# Patient Record
Sex: FEMALE | Race: WHITE | Employment: OTHER | URBAN - METROPOLITAN AREA
[De-identification: names, ages, dates, MRNs, and addresses within clinical notes are randomized per-mention and may not be internally consistent; named-entity substitution may affect disease eponyms.]

---

## 2022-06-04 ENCOUNTER — NURSE ONLY (OUTPATIENT)
Dept: LAB | Age: 77
End: 2022-06-04
Attending: FAMILY MEDICINE

## 2022-06-04 DIAGNOSIS — M62.81 MUSCLE WEAKNESS (GENERALIZED): Primary | ICD-10-CM

## 2022-06-04 LAB
ALBUMIN SERPL-MCNC: 3.4 G/DL (ref 3.4–5)
ALBUMIN/GLOB SERPL: 0.9 {RATIO} (ref 1–2)
ALP LIVER SERPL-CCNC: 70 U/L
ALT SERPL-CCNC: 23 U/L
ANION GAP SERPL CALC-SCNC: 5 MMOL/L (ref 0–18)
AST SERPL-CCNC: 10 U/L (ref 15–37)
BASOPHILS # BLD AUTO: 0.03 X10(3) UL (ref 0–0.2)
BASOPHILS NFR BLD AUTO: 0.3 %
BILIRUB SERPL-MCNC: 0.6 MG/DL (ref 0.1–2)
BUN BLD-MCNC: 15 MG/DL (ref 7–18)
CALCIUM BLD-MCNC: 10 MG/DL (ref 8.5–10.1)
CHLORIDE SERPL-SCNC: 107 MMOL/L (ref 98–112)
CO2 SERPL-SCNC: 25 MMOL/L (ref 21–32)
CREAT BLD-MCNC: 0.75 MG/DL
EOSINOPHIL # BLD AUTO: 0.15 X10(3) UL (ref 0–0.7)
EOSINOPHIL NFR BLD AUTO: 1.7 %
ERYTHROCYTE [DISTWIDTH] IN BLOOD BY AUTOMATED COUNT: 11.9 %
FASTING STATUS PATIENT QL REPORTED: YES
GLOBULIN PLAS-MCNC: 3.6 G/DL (ref 2.8–4.4)
GLUCOSE BLD-MCNC: 97 MG/DL (ref 70–99)
HCT VFR BLD AUTO: 44.4 %
HGB BLD-MCNC: 14.3 G/DL
IMM GRANULOCYTES # BLD AUTO: 0.05 X10(3) UL (ref 0–1)
IMM GRANULOCYTES NFR BLD: 0.6 %
LYMPHOCYTES # BLD AUTO: 1.35 X10(3) UL (ref 1–4)
LYMPHOCYTES NFR BLD AUTO: 15.1 %
MCH RBC QN AUTO: 30.2 PG (ref 26–34)
MCHC RBC AUTO-ENTMCNC: 32.2 G/DL (ref 31–37)
MCV RBC AUTO: 93.7 FL
MONOCYTES # BLD AUTO: 0.81 X10(3) UL (ref 0.1–1)
MONOCYTES NFR BLD AUTO: 9.1 %
NEUTROPHILS # BLD AUTO: 6.56 X10 (3) UL (ref 1.5–7.7)
NEUTROPHILS # BLD AUTO: 6.56 X10(3) UL (ref 1.5–7.7)
NEUTROPHILS NFR BLD AUTO: 73.2 %
OSMOLALITY SERPL CALC.SUM OF ELEC: 285 MOSM/KG (ref 275–295)
PLATELET # BLD AUTO: 272 10(3)UL (ref 150–450)
POTASSIUM SERPL-SCNC: 4.1 MMOL/L (ref 3.5–5.1)
PROT SERPL-MCNC: 7 G/DL (ref 6.4–8.2)
RBC # BLD AUTO: 4.74 X10(6)UL
SODIUM SERPL-SCNC: 137 MMOL/L (ref 136–145)
WBC # BLD AUTO: 9 X10(3) UL (ref 4–11)

## 2022-06-04 PROCEDURE — 80053 COMPREHEN METABOLIC PANEL: CPT

## 2022-06-04 PROCEDURE — 85025 COMPLETE CBC W/AUTO DIFF WBC: CPT

## 2022-06-05 ENCOUNTER — NURSE ONLY (OUTPATIENT)
Dept: LAB | Age: 77
End: 2022-06-05
Attending: FAMILY MEDICINE

## 2022-06-05 DIAGNOSIS — M62.81 MUSCLE WEAKNESS: Primary | ICD-10-CM

## 2022-06-05 LAB
ALBUMIN SERPL-MCNC: 3.1 G/DL (ref 3.4–5)
ALBUMIN/GLOB SERPL: 1 {RATIO} (ref 1–2)
ALP LIVER SERPL-CCNC: 71 U/L
ALT SERPL-CCNC: 18 U/L
ANION GAP SERPL CALC-SCNC: 5 MMOL/L (ref 0–18)
AST SERPL-CCNC: 13 U/L (ref 15–37)
BASOPHILS # BLD AUTO: 0.04 X10(3) UL (ref 0–0.2)
BASOPHILS NFR BLD AUTO: 0.5 %
BILIRUB SERPL-MCNC: 0.6 MG/DL (ref 0.1–2)
BUN BLD-MCNC: 14 MG/DL (ref 7–18)
CALCIUM BLD-MCNC: 9.8 MG/DL (ref 8.5–10.1)
CHLORIDE SERPL-SCNC: 106 MMOL/L (ref 98–112)
CO2 SERPL-SCNC: 26 MMOL/L (ref 21–32)
CREAT BLD-MCNC: 0.74 MG/DL
EOSINOPHIL # BLD AUTO: 0.11 X10(3) UL (ref 0–0.7)
EOSINOPHIL NFR BLD AUTO: 1.3 %
ERYTHROCYTE [DISTWIDTH] IN BLOOD BY AUTOMATED COUNT: 12.1 %
GLOBULIN PLAS-MCNC: 3.2 G/DL (ref 2.8–4.4)
GLUCOSE BLD-MCNC: 94 MG/DL (ref 70–99)
HCT VFR BLD AUTO: 40.1 %
HGB BLD-MCNC: 13.1 G/DL
IMM GRANULOCYTES # BLD AUTO: 0.06 X10(3) UL (ref 0–1)
IMM GRANULOCYTES NFR BLD: 0.7 %
LYMPHOCYTES # BLD AUTO: 1.34 X10(3) UL (ref 1–4)
LYMPHOCYTES NFR BLD AUTO: 15.6 %
MCH RBC QN AUTO: 29.9 PG (ref 26–34)
MCHC RBC AUTO-ENTMCNC: 32.7 G/DL (ref 31–37)
MCV RBC AUTO: 91.6 FL
MONOCYTES # BLD AUTO: 0.86 X10(3) UL (ref 0.1–1)
MONOCYTES NFR BLD AUTO: 10 %
NEUTROPHILS # BLD AUTO: 6.17 X10 (3) UL (ref 1.5–7.7)
NEUTROPHILS # BLD AUTO: 6.17 X10(3) UL (ref 1.5–7.7)
NEUTROPHILS NFR BLD AUTO: 71.9 %
OSMOLALITY SERPL CALC.SUM OF ELEC: 284 MOSM/KG (ref 275–295)
PLATELET # BLD AUTO: 260 10(3)UL (ref 150–450)
POTASSIUM SERPL-SCNC: 3.9 MMOL/L (ref 3.5–5.1)
PROT SERPL-MCNC: 6.3 G/DL (ref 6.4–8.2)
RBC # BLD AUTO: 4.38 X10(6)UL
SODIUM SERPL-SCNC: 137 MMOL/L (ref 136–145)
WBC # BLD AUTO: 8.6 X10(3) UL (ref 4–11)

## 2022-06-05 PROCEDURE — 80053 COMPREHEN METABOLIC PANEL: CPT

## 2022-06-05 PROCEDURE — 85025 COMPLETE CBC W/AUTO DIFF WBC: CPT

## 2025-01-09 ENCOUNTER — HOSPITAL ENCOUNTER (INPATIENT)
Facility: HOSPITAL | Age: 80
LOS: 3 days | Discharge: HOME | End: 2025-01-13
Attending: EMERGENCY MEDICINE | Admitting: STUDENT IN AN ORGANIZED HEALTH CARE EDUCATION/TRAINING PROGRAM
Payer: MEDICARE

## 2025-01-09 ENCOUNTER — APPOINTMENT (OUTPATIENT)
Dept: CT IMAGING | Facility: HOSPITAL | Age: 80
End: 2025-01-09
Attending: EMERGENCY MEDICINE
Payer: MEDICARE

## 2025-01-09 DIAGNOSIS — J18.9 COMMUNITY ACQUIRED PNEUMONIA OF RIGHT LOWER LOBE OF LUNG: ICD-10-CM

## 2025-01-09 DIAGNOSIS — G35 MULTIPLE SCLEROSIS EXACERBATION (HCC): ICD-10-CM

## 2025-01-09 DIAGNOSIS — R53.1 WEAKNESS GENERALIZED: Primary | ICD-10-CM

## 2025-01-09 LAB
ALBUMIN SERPL-MCNC: 4.6 G/DL (ref 3.2–4.8)
ALBUMIN/GLOB SERPL: 1.6 {RATIO} (ref 1–2)
ALP LIVER SERPL-CCNC: 97 U/L
ALT SERPL-CCNC: 18 U/L
ANION GAP SERPL CALC-SCNC: 5 MMOL/L (ref 0–18)
AST SERPL-CCNC: 16 U/L (ref ?–34)
BASOPHILS # BLD AUTO: 0.03 X10(3) UL (ref 0–0.2)
BASOPHILS NFR BLD AUTO: 0.4 %
BILIRUB SERPL-MCNC: 0.6 MG/DL (ref 0.2–1.1)
BUN BLD-MCNC: 15 MG/DL (ref 9–23)
CALCIUM BLD-MCNC: 10.6 MG/DL (ref 8.7–10.4)
CHLORIDE SERPL-SCNC: 103 MMOL/L (ref 98–112)
CO2 SERPL-SCNC: 27 MMOL/L (ref 21–32)
CREAT BLD-MCNC: 1.03 MG/DL
EGFRCR SERPLBLD CKD-EPI 2021: 55 ML/MIN/1.73M2 (ref 60–?)
EOSINOPHIL # BLD AUTO: 0.07 X10(3) UL (ref 0–0.7)
EOSINOPHIL NFR BLD AUTO: 1 %
ERYTHROCYTE [DISTWIDTH] IN BLOOD BY AUTOMATED COUNT: 12.3 %
GLOBULIN PLAS-MCNC: 2.8 G/DL (ref 2–3.5)
GLUCOSE BLD-MCNC: 106 MG/DL (ref 70–99)
GLUCOSE BLD-MCNC: 107 MG/DL (ref 70–99)
HCT VFR BLD AUTO: 43.8 %
HGB BLD-MCNC: 15.2 G/DL
IMM GRANULOCYTES # BLD AUTO: 0.02 X10(3) UL (ref 0–1)
IMM GRANULOCYTES NFR BLD: 0.3 %
LYMPHOCYTES # BLD AUTO: 0.76 X10(3) UL (ref 1–4)
LYMPHOCYTES NFR BLD AUTO: 10.9 %
MCH RBC QN AUTO: 30.3 PG (ref 26–34)
MCHC RBC AUTO-ENTMCNC: 34.7 G/DL (ref 31–37)
MCV RBC AUTO: 87.4 FL
MONOCYTES # BLD AUTO: 0.78 X10(3) UL (ref 0.1–1)
MONOCYTES NFR BLD AUTO: 11.2 %
NEUTROPHILS # BLD AUTO: 5.32 X10 (3) UL (ref 1.5–7.7)
NEUTROPHILS # BLD AUTO: 5.32 X10(3) UL (ref 1.5–7.7)
NEUTROPHILS NFR BLD AUTO: 76.2 %
OSMOLALITY SERPL CALC.SUM OF ELEC: 281 MOSM/KG (ref 275–295)
PLATELET # BLD AUTO: 190 10(3)UL (ref 150–450)
POTASSIUM SERPL-SCNC: 4.2 MMOL/L (ref 3.5–5.1)
PROT SERPL-MCNC: 7.4 G/DL (ref 5.7–8.2)
RBC # BLD AUTO: 5.01 X10(6)UL
SODIUM SERPL-SCNC: 135 MMOL/L (ref 136–145)
TROPONIN I SERPL HS-MCNC: 3 NG/L
WBC # BLD AUTO: 7 X10(3) UL (ref 4–11)

## 2025-01-09 PROCEDURE — 70498 CT ANGIOGRAPHY NECK: CPT | Performed by: EMERGENCY MEDICINE

## 2025-01-09 PROCEDURE — 70496 CT ANGIOGRAPHY HEAD: CPT | Performed by: EMERGENCY MEDICINE

## 2025-01-09 PROCEDURE — 70450 CT HEAD/BRAIN W/O DYE: CPT | Performed by: EMERGENCY MEDICINE

## 2025-01-10 ENCOUNTER — APPOINTMENT (OUTPATIENT)
Dept: GENERAL RADIOLOGY | Facility: HOSPITAL | Age: 80
End: 2025-01-10
Attending: EMERGENCY MEDICINE
Payer: MEDICARE

## 2025-01-10 PROBLEM — J18.9 COMMUNITY ACQUIRED PNEUMONIA OF RIGHT LOWER LOBE OF LUNG: Status: ACTIVE | Noted: 2025-01-10

## 2025-01-10 PROBLEM — G35 MULTIPLE SCLEROSIS EXACERBATION (HCC): Status: ACTIVE | Noted: 2025-01-10

## 2025-01-10 LAB
ANION GAP SERPL CALC-SCNC: 10 MMOL/L (ref 0–18)
BILIRUB UR QL STRIP.AUTO: NEGATIVE
BUN BLD-MCNC: 13 MG/DL (ref 9–23)
CALCIUM BLD-MCNC: 9.8 MG/DL (ref 8.7–10.4)
CHLORIDE SERPL-SCNC: 106 MMOL/L (ref 98–112)
CLARITY UR REFRACT.AUTO: CLEAR
CO2 SERPL-SCNC: 22 MMOL/L (ref 21–32)
COLOR UR AUTO: COLORLESS
CREAT BLD-MCNC: 0.84 MG/DL
EGFRCR SERPLBLD CKD-EPI 2021: 71 ML/MIN/1.73M2 (ref 60–?)
FLUAV + FLUBV RNA SPEC NAA+PROBE: NEGATIVE
FLUAV + FLUBV RNA SPEC NAA+PROBE: NEGATIVE
GLUCOSE BLD-MCNC: 142 MG/DL (ref 70–99)
GLUCOSE UR STRIP.AUTO-MCNC: NORMAL MG/DL
KETONES UR STRIP.AUTO-MCNC: NEGATIVE MG/DL
LACTATE SERPL-SCNC: 0.9 MMOL/L (ref 0.5–2)
LEUKOCYTE ESTERASE UR QL STRIP.AUTO: 250
NT-PROBNP SERPL-MCNC: 200 PG/ML (ref ?–450)
OSMOLALITY SERPL CALC.SUM OF ELEC: 289 MOSM/KG (ref 275–295)
PH UR STRIP.AUTO: 5 [PH] (ref 5–8)
POTASSIUM SERPL-SCNC: 4.1 MMOL/L (ref 3.5–5.1)
PROCALCITONIN SERPL-MCNC: 0.08 NG/ML (ref ?–0.05)
PROT UR STRIP.AUTO-MCNC: NEGATIVE MG/DL
RBC UR QL AUTO: NEGATIVE
RSV RNA SPEC NAA+PROBE: NEGATIVE
SARS-COV-2 RNA RESP QL NAA+PROBE: NOT DETECTED
SODIUM SERPL-SCNC: 138 MMOL/L (ref 136–145)
SP GR UR STRIP.AUTO: >1.03 (ref 1–1.03)
UROBILINOGEN UR STRIP.AUTO-MCNC: NORMAL MG/DL

## 2025-01-10 PROCEDURE — 71045 X-RAY EXAM CHEST 1 VIEW: CPT | Performed by: EMERGENCY MEDICINE

## 2025-01-10 PROCEDURE — 99223 1ST HOSP IP/OBS HIGH 75: CPT | Performed by: STUDENT IN AN ORGANIZED HEALTH CARE EDUCATION/TRAINING PROGRAM

## 2025-01-10 PROCEDURE — 99223 1ST HOSP IP/OBS HIGH 75: CPT | Performed by: OTHER

## 2025-01-10 RX ORDER — SODIUM PHOSPHATE, DIBASIC AND SODIUM PHOSPHATE, MONOBASIC 7; 19 G/230ML; G/230ML
1 ENEMA RECTAL ONCE AS NEEDED
Status: DISCONTINUED | OUTPATIENT
Start: 2025-01-10 | End: 2025-01-13

## 2025-01-10 RX ORDER — ECHINACEA PURPUREA EXTRACT 125 MG
1 TABLET ORAL
Status: DISCONTINUED | OUTPATIENT
Start: 2025-01-10 | End: 2025-01-13

## 2025-01-10 RX ORDER — BENZONATATE 100 MG/1
200 CAPSULE ORAL 3 TIMES DAILY PRN
Status: DISCONTINUED | OUTPATIENT
Start: 2025-01-10 | End: 2025-01-13

## 2025-01-10 RX ORDER — ACETAMINOPHEN 650 MG/1
650 SUPPOSITORY RECTAL ONCE
Status: COMPLETED | OUTPATIENT
Start: 2025-01-10 | End: 2025-01-10

## 2025-01-10 RX ORDER — BISACODYL 10 MG
10 SUPPOSITORY, RECTAL RECTAL
Status: DISCONTINUED | OUTPATIENT
Start: 2025-01-10 | End: 2025-01-13

## 2025-01-10 RX ORDER — TIZANIDINE 2 MG/1
2 TABLET ORAL EVERY 6 HOURS PRN
Status: DISCONTINUED | OUTPATIENT
Start: 2025-01-10 | End: 2025-01-13

## 2025-01-10 RX ORDER — CLONAZEPAM 0.5 MG/1
0.5 TABLET ORAL NIGHTLY
COMMUNITY

## 2025-01-10 RX ORDER — METOCLOPRAMIDE HYDROCHLORIDE 5 MG/ML
5 INJECTION INTRAMUSCULAR; INTRAVENOUS EVERY 8 HOURS PRN
Status: DISCONTINUED | OUTPATIENT
Start: 2025-01-10 | End: 2025-01-13

## 2025-01-10 RX ORDER — ACETAMINOPHEN 500 MG
500 TABLET ORAL EVERY 4 HOURS PRN
Status: DISCONTINUED | OUTPATIENT
Start: 2025-01-10 | End: 2025-01-13

## 2025-01-10 RX ORDER — LACTOBACILLUS ACIDOPHILUS 500MM CELL
1 CAPSULE ORAL 2 TIMES DAILY
Status: DISCONTINUED | OUTPATIENT
Start: 2025-01-10 | End: 2025-01-13

## 2025-01-10 RX ORDER — ATORVASTATIN CALCIUM 20 MG/1
20 TABLET, FILM COATED ORAL NIGHTLY
Status: DISCONTINUED | OUTPATIENT
Start: 2025-01-10 | End: 2025-01-13

## 2025-01-10 RX ORDER — SENNOSIDES 8.6 MG
17.2 TABLET ORAL NIGHTLY PRN
Status: DISCONTINUED | OUTPATIENT
Start: 2025-01-10 | End: 2025-01-13

## 2025-01-10 RX ORDER — FOLIC ACID 0.4 MG
400 TABLET ORAL DAILY
COMMUNITY

## 2025-01-10 RX ORDER — ONDANSETRON 2 MG/ML
4 INJECTION INTRAMUSCULAR; INTRAVENOUS EVERY 6 HOURS PRN
Status: DISCONTINUED | OUTPATIENT
Start: 2025-01-10 | End: 2025-01-13

## 2025-01-10 RX ORDER — METOCLOPRAMIDE HYDROCHLORIDE 5 MG/ML
10 INJECTION INTRAMUSCULAR; INTRAVENOUS EVERY 8 HOURS PRN
Status: DISCONTINUED | OUTPATIENT
Start: 2025-01-10 | End: 2025-01-10

## 2025-01-10 RX ORDER — PANTOPRAZOLE SODIUM 20 MG/1
20 TABLET, DELAYED RELEASE ORAL
Status: DISCONTINUED | OUTPATIENT
Start: 2025-01-10 | End: 2025-01-13

## 2025-01-10 RX ORDER — FOLIC ACID 0.4 MG
400 TABLET ORAL DAILY
Status: DISCONTINUED | OUTPATIENT
Start: 2025-01-10 | End: 2025-01-13

## 2025-01-10 RX ORDER — ALBUTEROL SULFATE 0.83 MG/ML
2.5 SOLUTION RESPIRATORY (INHALATION) EVERY 4 HOURS PRN
Status: DISCONTINUED | OUTPATIENT
Start: 2025-01-10 | End: 2025-01-13

## 2025-01-10 RX ORDER — ASPIRIN 325 MG
325 TABLET ORAL DAILY
COMMUNITY

## 2025-01-10 RX ORDER — POLYETHYLENE GLYCOL 3350 17 G/17G
17 POWDER, FOR SOLUTION ORAL DAILY PRN
Status: DISCONTINUED | OUTPATIENT
Start: 2025-01-10 | End: 2025-01-13

## 2025-01-10 RX ORDER — ENOXAPARIN SODIUM 100 MG/ML
40 INJECTION SUBCUTANEOUS DAILY
Status: DISCONTINUED | OUTPATIENT
Start: 2025-01-10 | End: 2025-01-13

## 2025-01-10 RX ORDER — SACCHAROMYCES BOULARDII 250 MG
250 CAPSULE ORAL 2 TIMES DAILY
COMMUNITY

## 2025-01-10 RX ORDER — ASPIRIN 325 MG
325 TABLET ORAL DAILY
Status: DISCONTINUED | OUTPATIENT
Start: 2025-01-10 | End: 2025-01-13

## 2025-01-10 RX ORDER — MIRABEGRON 50 MG/1
50 TABLET, FILM COATED, EXTENDED RELEASE ORAL DAILY
Status: DISCONTINUED | OUTPATIENT
Start: 2025-01-10 | End: 2025-01-13

## 2025-01-10 NOTE — H&P
[facility]  [facility]     Starla Ortiz Patient Status:  Emergency    9/15/1945 MRN [id number]   [facility] EMERGENCY DEPARTMENT Attending Juan Jose Tuttle MD   Hosp Day # 0 PCP GIFTY ABDI     Chief Complaint: weakness    Subjective:    History of Present Illness:     Starla Ortiz is a 79 year old female with PMHx MS who presented to the hospital for weakness. She had a non-productive cough for the past 3 days. She saw her PCP and had a negative xray and it was presumed to be viral in etiology. Over the past 24 hours, she decompensated and was having problems swallowing with coughing after eating. She was more fatigued as well. Her daughter tried to get her up from the chair and she was too weak to stand prompting her to call 911.    History/Other:    Past Medical History:  Past Medical History:    CANCER    BASAL CA ON LEFT CHEEK    HEADACHES    MS CAUSED    HOSPITALIZATIONS    4 WITH VIRAL INFECTIONS AND BACTERIAL INFECTION     MENOPAUSE    Menopause    MS (multiple sclerosis) (HCC)    OSTEOPENIA    Osteopenia     Past Surgical History:   Past Surgical History:   Procedure Laterality Date    Colonoscopy      D & c  1970,    Excisional biospy right  AGE 35    Hysterectomy  1984    HARI BSO endometriosis fibroids    Needle biopsy left  2019    benign cyst     Other surgical history      L BREAST biopsy    Other surgical history      REMOVAL OF BASAL CA GACE    Surgery - external      neck surgery    Tonsillectomy        Family History:   Family History   Problem Relation Age of Onset    Cancer Father         LYMPHOMA    Other (Other) Mother         DEMENTIA    Cancer Brother         PROSTATE    Hypertension Daughter      Social History:    reports that she has quit smoking. She has a 2 pack-year smoking history. She has never used smokeless tobacco. She reports current alcohol use. She reports that she does not use drugs.     Allergies:  Allergies[1]    Medications:  Medications Ordered Prior to Encounter[2]    Review of Systems:   A comprehensive review of systems was completed.    Pertinent positives and negatives noted in the HPI.    Objective:   Physical Exam:    /71   Pulse 103   Temp 100.4 °F (38 °C) (Oral)   Resp 20   Wt 166 lb 0.1 oz (75.3 kg)   SpO2 95%   BMI 28.49 kg/m²   General: No acute distress, Alert  Respiratory: scattered rhonchi bl,  no wheezes, on room air  Cardiovascular: S1, S2. Regular rate and rhythm  Abdomen: Soft, Non-tender, non-distended, positive bowel sounds  Neuro: muscle strength 4+/5 with left hip flexion, sensation to light touch intact  Extremities: No edema    Results:    Labs:      Labs Last 24 Hours:    Recent Labs   Lab 01/09/25  2307   RBC 5.01   HGB 15.2   HCT 43.8   MCV 87.4   MCH 30.3   MCHC 34.7   RDW 12.3   NEPRELIM 5.32   WBC 7.0   .0       Recent Labs   Lab 01/09/25  2307   *   BUN 15   CREATSERUM 1.03*   EGFRCR 55*   CA 10.6*   ALB 4.6   *   K 4.2      CO2 27.0   ALKPHO 97   AST 16   ALT 18   BILT 0.6   TP 7.4       Estimated Glomerular Filtration Rate: 55.4 mL/min/1.73m2 (A) (by CKD-EPI based on SCr of 1.03 mg/dL (H)).    No results found for: \"PT\", \"INR\"    Recent Labs   Lab 01/09/25  2307   TROPHS 3       No results for input(s): \"TROP\", \"PBNP\" in the last 168 hours.    No results for input(s): \"PCT\" in the last 168 hours.    Imaging: Imaging data reviewed in Epic.    Assessment & Plan:      #Pneumonia vs UTI with MS exacerbation  #SIRS positive  -COVID/ Flu/ RSV negative  -UA showing 2+ nitrite, 250 leuk est, 21-50 WBC, rare bacteria, few epi cells  -CT brain with chronic small vessel ischemic changes  -CTA head and neck without significant stenosis, left frontal lobe cavernoma  -chest xray showing pulmonary vascular congestion, RML opacity  -met 2 SIRS criteria: , RR 24  -blood cx pending  -urine cx pending  -obtain sputum cx  -check procalcitonin for  possible PNA  -check lactate  -antibiotics: azithromycin, rocephin  -nebs, mucinex, tylenol  -solumedrol for suspected MS exacerbation  -PT/OT  -cont home MS meds  -neurology c/s    #elevated serum creatinine  -Cr 1.03 with BUN 15: baseline 0.74 with GFR 79  -less than 1.5 x elevation  -cont to monitor BMP  -strict I/O, avoid nephrotoxins    #HLD  -cont home statin    #neurogenic bladder  -cont home meds    #GERD  -cont home PPI      Plan of care discussed with ED physician    Lidia Martinez DO    Supplementary Documentation:     The 21st Century Cures Act makes medical notes like these available to patients in the interest of transparency. Please be advised this is a medical document. Medical documents are intended to carry relevant information, facts as evident, and the clinical opinion of the practitioner. The medical note is intended as peer to peer communication and may appear blunt or direct. It is written in medical language and may contain abbreviations or verbiage that are unfamiliar.                                       [1]   Allergies  Allergen Reactions    Codeine [Opioid Analgesics]    [2]   No current facility-administered medications on file prior to encounter.     Current Outpatient Medications on File Prior to Encounter   Medication Sig Dispense Refill    trimethoprim 100 MG Oral Tab Take 100 mg by mouth daily.      atorvastatin 20 MG Oral Tab Take 20 mg by mouth nightly.      aspirin 81 MG Oral Chew Tab       Acidophilus/Pectin Oral Cap Take 1 capsule by mouth daily.      PREMARIN 0.625 MG Oral Tab TAKE 1 TABLET BY MOUTH EVERY DAY 90 tablet 1    MYRBETRIQ 50 MG Oral Tablet 24 Hr Take 1 tablet by mouth daily.      Ergocalciferol (VITAMIN D CAPS 44822 IU OR)       Cranberry 1000 MG Oral Cap Take by mouth.      Esomeprazole Magnesium 20 MG Oral Capsule Delayed Release Take 20 mg by mouth every morning before breakfast.      TiZANidine HCl 2 MG Oral Tab TK 1 T PO  TID AC  0    KLONOPIN OR None  Entered

## 2025-01-10 NOTE — SLP NOTE
ADULT SWALLOWING EVALUATION    ASSESSMENT    ASSESSMENT/OVERALL IMPRESSION:  Order received for bedside swallow evaluation to r/o aspiration. Pt presented to [facility] via EMS with report of generalized weakness and cough. Pmhx includes multiple sclerosis. Impression includes MS exacerbation, community acquired PNA of RLL and UTI. CT Brain revealed no acute process. Pt failed RN dysphagia screening and currently NPO.    Pt found lying semi-upright in bed; alert & participatory; able to follow all commands for testing; daughter present in room served as primary historian. She stated that she lives with patient and is primary caretaker; very knowledgeable re: pt's hx, diet and swallowing strategies. She reported patient has hx of dysphagia and treatment in past however currently tolerating regular diet with thin liquid consistency well at home utilizing strategies. Oral mech/motor exam revealed patient with her own dentition which appeared intact & functional. Mild decrease in strength and rate overall during oral motor exam. Clear vocal quality, volitional swallow and productive cough elicited. HOB positioned fully upright to 90 degrees and patient assisted with po trials of thin via spoon and cup; pureed and soft solids. Adequate retrieval & containment of bolus with mildly prolonged but complete mastication of solid; timely oral transit and no significant oral residue observed post swallow. Mild delay in pharyngeal response possible; hyolaryngeal elevation appeared adequate when palpated. X 1 delayed cough following soft solid which did not appear related to swallow.    Oral with possible pharyngeal dysphagia - no overt clinical s/s of aspiration noted however patient is at risk given hx and underlying dx of MS. Recommend initiate soft/moist & minced diet with thin liquids in small, controlled sips only - no straws. Supervise/assist with meals as necessary. Give pills whole or crushed in pureed.     Following exam,  discussed results, aspiration risks, diet recommendations, aspiration precautions and plan at length with patient and pt's daughter present in room. Answered several questions to her apparent satisfaction with good understanding reported. Informed RN of results and recommendations. D/c po diet if any overt clinical s/s of aspiration and complete video swallow study. Will continue to follow as per plan.         RECOMMENDATIONS   Diet Recommendations - Solids: Mechanical soft ground/ Minced & Moist  Diet Recommendations - Liquids: Thin Liquids (small, single controlled sips only - no straws)                           Aspiration Precautions: Upright position;Slow rate;Small bites;Small sips;No straw;1:1 feeding  Medication Administration Recommendations: Whole in puree;Crushed in puree  Treatment Plan/Recommendations: Dysphagia therapy;Aspiration precautions    HISTORY   MEDICAL HISTORY  Reason for Referral: R/O aspiration    Problem List  Principal Problem:    Weakness generalized  Active Problems:    Multiple sclerosis exacerbation (HCC)    Community acquired pneumonia of right lower lobe of lung      Past Medical History  Past Medical History:    CANCER    BASAL CA ON LEFT CHEEK    HEADACHES    MS CAUSED    HOSPITALIZATIONS    4 WITH VIRAL INFECTIONS AND BACTERIAL INFECTION     MENOPAUSE    Menopause    MS (multiple sclerosis) (HCC)    OSTEOPENIA    Osteopenia    Stroke (HCC)       Prior Living Situation: Home with support (lives with daughter)  Diet Prior to Admission: Regular;Thin liquids  Precautions: Aspiration    Patient/Family Goals: safest/least restrictive diet    SWALLOWING HISTORY  Current Diet Consistency: NPO  Dysphagia History: see above  Imaging Results: CXR 1/10/25  CONCLUSION:  Heart size within normal limits.  Mild perihilar interstitial and bronchial wall thickening may reflect interstitial pulmonary edema versus bronchitis or reactive airway disease/asthma.  No discrete airspace consolidation.   The pleural   spaces are clear.   SUBJECTIVE       OBJECTIVE   ORAL MOTOR EXAMINATION  Dentition: Natural;Functional  Symmetry: Within Functional Limits  Strength: Within Functional Limits  Tone: Within Functional Limits  Range of Motion: Within Functional Limits  Rate of Motion: Within Functional Limits    Voice Quality: Clear  Respiratory Status: Unlabored  Consistencies Trialed: Thin liquids;Puree;Soft solid  Method of Presentation: Staff/Clinician assistance;Spoon;Cup;Single sips  Patient Positioned: Upright;Midline    Oral Phase of Swallow: Within Functional Limits                      Pharyngeal Phase of Swallow: Appears Impaired  Laryngeal Elevation Timing:  (possible delayed)  Laryngeal Elevation Strength:  (possible reduced)  Laryngeal Elevation Coordination: Appears intact  (Please note: Silent aspiration cannot be evaluated clinically. Videofluoroscopic Swallow Study is required to rule-out silent aspiration.)    Esophageal Phase of Swallow: No complaints consistent with possible esophageal involvement  Comments:               GOALS  Goal #1 The patient will tolerate soft/minces & moist consistency and thin/small, controlled sips - no straw liquids without overt signs or symptoms of aspiration with 95 % accuracy over 2 session(s).  New goal   Goal #2 The patient/family/caregiver will demonstrate understanding and implementation of aspiration precautions and swallow strategies independently over 2 session(s).    In Progress   Goal #3 The patient will utilize compensatory strategies as outlined by  BSSE (clinical evaluation) including Slow rate, Small bites, Small sips, Alternate liquids/solids, No straws, Upright 90 degrees, Feed patient, Supervision with meals with as needed assistance 100 % of the time across 2 sessions.  New goal                              FOLLOW UP  Treatment Plan/Recommendations: Dysphagia therapy;Aspiration precautions  Duration: 1 week  Follow Up Needed (Documentation Required):  Yes  SLP Follow-up Date: 01/11/25    Thank you for your referral.   If you have any questions, please contact HELADIO Rose MA, CCC-SLP  Pager [phone]

## 2025-01-10 NOTE — PLAN OF CARE
NURSING ADMISSION NOTE      Patient admitted via Cart  Oriented to room.  Safety precautions initiated.  Bed in low position.  Call light in reach.    POC discussed with daughter POA at bedside and agreeable. Patient skin intact, protective foam placed to sacrum with PCT [place].

## 2025-01-10 NOTE — SPIRITUAL CARE NOTE
Spiritual Care Visit Note    Patient Name: Starla Ortiz Date of Spiritual Care Visit: 01/10/25   : 9/15/1945 Primary Dx: Weakness generalized   Referred By: Referral From: Other (Comment) (ED rounds)    Spiritual Care Taxonomy:    Intended Effects: Demonstrate caring and concern    Methods: Encourage sharing of feelings;Offer emotional support    Interventions: Acknowledge current situation;Active listening;Ask guided questions;Ask questions to bring forth feelings;Discuss concerns    Visit Type/Summary:  Starla was being prepared for Xray and speaking with her daughter, (Kristie, a [employer]  serving [facility]  [employer]) when  arrived at her room.  Both Starla and Pinky seemed calm.  Kristie was concerned that her mother was not at her normal hospital and her records were not here, but felt her mother was being taken care of sufficiently.  She was aware that her mother would likely be at [facility] overnight.    - Spiritual Care: Consulted with RN prior to visit. Offered empathic listening and emotional support. Provided information regarding how to contact Spiritual Care. Kristie is aware a  remains available , as needed for follow up.    Spiritual Care support can be requested via an Epic consult. For urgent/immediate needs, please contact the On Call  at: [facility]: ext [phone]    Yenni Redd MDiv.  Staff

## 2025-01-10 NOTE — CONSULTS
[facility]   NEUROLOGY   CONSULT NOTE    Admission date: 1/9/2025  Reason for Consult: MS exacerbation./Stroke.  Chief Complaint:   Chief Complaint   Patient presents with   • Stroke   ________________________________________________________________    History     History of Presenting Illness  79 year old female with history of multiple medical problems including primary progressive multiple sclerosis brought to the hospital because of weakness.  Patient has been having nonproductive cough for the last 3 days and was suspected of having viral illness.  Patient was noted to be generally more weak, drowsy and unable to perform his activities of daily living.  She was brought to the hospital for further evaluation.  Currently feels much better.  States that she usually has weakness when she has infections.  Does not feel that this is related to any kind of exacerbation since she has no history of relapsing MS.  Currently feels her strength in both lower extremities are significantly improved.  She also feels that cognitively she is back to normal.  This is secondary by patient's family member.    History obtained from patient, family member and chart review.    Past Medical History:   • CANCER    BASAL CA ON LEFT CHEEK   • HEADACHES    MS CAUSED   • HOSPITALIZATIONS    4 WITH VIRAL INFECTIONS AND BACTERIAL INFECTION    • MENOPAUSE   • Menopause   • MS (multiple sclerosis) (HCC)   • OSTEOPENIA   • Osteopenia   • Stroke (HCC)     Past Surgical History:   Procedure Laterality Date   • Colonoscopy  2014   • D & c  1970,1972   • Excisional biospy right  AGE 35   • Hysterectomy  1984    HARI BSO endometriosis fibroids   • Needle biopsy left  2019    benign cyst    • Other surgical history  1978    L BREAST biopsy   • Other surgical history  1994    REMOVAL OF BASAL CA GACE   • Surgery - external      neck surgery   • Tonsillectomy       Social History     Socioeconomic History   • Marital status:     Tobacco Use   • Smoking status: Former     Current packs/day: 0.20     Average packs/day: 0.2 packs/day for 10.0 years (2.0 ttl pk-yrs)     Types: Cigarettes   • Smokeless tobacco: Never   Vaping Use   • Vaping status: Never Used   Substance and Sexual Activity   • Alcohol use: Yes     Comment: SOCIAL   • Drug use: No   • Sexual activity: Yes     Partners: Male     Birth control/protection: Hysterectomy     Social Drivers of Health     Food Insecurity: No Food Insecurity (1/10/2025)    Food Insecurity    • Food Insecurity: Never true   Transportation Needs: No Transportation Needs (1/10/2025)    Transportation Needs    • Lack of Transportation: No   Housing Stability: Low Risk  (1/10/2025)    Housing Stability    • Housing Instability: No     Family History   Problem Relation Age of Onset   • Cancer Father         LYMPHOMA   • Other (Other) Mother         DEMENTIA   • Cancer Brother         PROSTATE   • Hypertension Daughter      Allergies Allergies[1]    Home Meds  Current Outpatient Medications   Medication Instructions   • Acidophilus/Pectin Oral Cap 1 capsule, Oral, Daily   • aspirin 325 mg, Daily   • atorvastatin (LIPITOR) 20 mg, Nightly   • clonazePAM (KLONOPIN) 0.5 mg, Nightly   • Cranberry 500 mg, Daily   • Ergocalciferol (VITAMIN D CAPS 35546 IU OR) No dose, route, or frequency recorded.   • Esomeprazole Magnesium (NEXIUM) 20 mg, Every morning before breakfast   • folic acid (FOLVITE) 400 mcg, Daily   • melatonin 3 mg, Nightly   • MYRBETRIQ 50 MG Oral Tablet 24 Hr 1 tablet, Daily   • PREMARIN 0.625 MG Oral Tab TAKE 1 TABLET BY MOUTH EVERY DAY   • saccharomyces boulardii (FLORASTOR) 250 mg, 2 times daily   • TiZANidine HCl 2 MG Oral Tab TK 1 T PO  TID AC   • trimethoprim (TRIMPEX) 100 mg, Daily     Scheduled Meds:  • [START ON 1/11/2025] azithromycin  500 mg Intravenous Q24H   • enoxaparin  40 mg Subcutaneous Daily   • [START ON 1/11/2025] cefTRIAXone  2 g Intravenous Q24H   • aspirin  325 mg Oral Daily    • atorvastatin  20 mg Oral Nightly   • pantoprazole  20 mg Oral QAM AC   • folic acid  400 mcg Oral Daily   • [Held by provider] Mirabegron ER  50 mg Oral Daily   • acidophilus  1 capsule Oral BID     Continuous Infusions:  PRN Meds:•  albuterol  •  acetaminophen  •  melatonin  •  polyethylene glycol (PEG 3350)  •  sennosides  •  bisacodyl  •  fleet enema  •  ondansetron  •  benzonatate  •  glycerin-hypromellose-  •  sodium chloride  •  metoclopramide  •  tiZANidine    OBJECTIVE   VITAL SIGNS:   Temp:  [98.3 °F (36.8 °C)-100.4 °F (38 °C)] 98.5 °F (36.9 °C)  Pulse:  [] 82  Resp:  [16-24] 16  BP: (104-154)/(54-96) 104/77  SpO2:  [92 %-98 %] 93 %    PHYSICAL EXAM:    NEUROLOGIC:    Mental Status:  A&O x 4, Follows simple commands, no obvious aphasia or dysarthria  Cranial nerves: PERRL.  Visual fields full.  EOMI. right facial nasolabial fold flattening.  Hearing grossly intact. Tongue midline with normal movements.   Motor: Drift: Right alton drift; Motor exam is symmetrical and antigravity in all extremities bilaterally  Sensation: Intact to light touch bilaterally  Cerebellar: Bilateral dysmetria.      LABORATORY DATA:  Last 24 hour labs were reviewed in detail.  Recent Labs   Lab 01/09/25  2307 01/10/25  0531   * 138   K 4.2 4.1    106   CO2 27.0 22.0   * 142*   BUN 15 13   CREATSERUM 1.03* 0.84     Recent Labs   Lab 01/09/25 2307   WBC 7.0   HGB 15.2   .0     Recent Labs   Lab 01/09/25 2307   ALT 18   AST 16       Radiology:    XR CHEST AP PORTABLE  (CPT=71045)    Result Date: 1/10/2025  CONCLUSION:  Heart size within normal limits.  Mild perihilar interstitial and bronchial wall thickening may reflect interstitial pulmonary edema versus bronchitis or reactive airway disease/asthma.  No discrete airspace consolidation.  The pleural spaces are clear.   LOCATION:  [facility]      Dictated by (CST): Guy Colorado MD on 1/10/2025 at 8:38 AM     Finalized by (CST): Guy Colorado MD  on 1/10/2025 at 8:39 AM       CT STROKE CTA BRAIN/CTA NECK (W IV)(CPT=70496/61100)    Result Date: 1/9/2025  CONCLUSION:  There is no evidence of large vessel intracranial arterial occlusion.  If there is persistent concern for acute ischemia then recommend MRI.  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  No evidence of hemodynamically significant stenosis at either carotid bifurcation based on NASCET criteria.   LOCATION:  [facility]   Dictated by (CST): Tomasz Mcneil MD on 1/09/2025 at 11:12 PM     Finalized by (CST): Tomasz Mcneil MD on 1/09/2025 at 11:29 PM       CT STROKE BRAIN (NO IV)(CPT=70450)    Result Date: 1/9/2025  CONCLUSION:  No significant midline shift or mass effect.  No acute intracranial hemorrhage.  Mild chronic microvascular ischemic changes are likely present within the white matter.  If there is persistent clinical concern then consider MRI.   Critical results were called to Dr. Tuttle at 2304 hours on 1/9/25.  There was appropriate read back.    LOCATION:  [facility]   Dictated by (CST): Tomasz Mcneil MD on 1/09/2025 at 11:02 PM     Finalized by (CST): Tomasz Mcneil MD on 1/09/2025 at 11:05 PM      ASSESSMENT/PLAN   79 year old female with:    Generalized weakness suspected to be due to multiple sclerosis exacerbation.  Patient has history of primary progressive multiple sclerosis and usually does not have exacerbations.  However, patient does get some weakness when she is infected or has other medical problems.  Patient currently back to baseline.  Denies any significant new weakness.  No further intervention for multiple sclerosis, as far as steroids are concerned is indicated.    Infection.  Currently being treated with antibiotics.  Further management as per hospitalist.  History of stroke with residual right-sided facial weakness.  Patient to continue aspirin and statin as previously.  Case discussed with patient and family member in detail.  Agrees to assessment and  plan.  Patient to follow-up with her neurologist 4 weeks after discharge.  No further neurology intervention recommended at this time.  Please feel free to call for further queries.      Principal Problem:    Weakness generalized  Active Problems:    Multiple sclerosis exacerbation (HCC)    Community acquired pneumonia of right lower lobe of lung       Ricardo Jean-Baptiste MD  Neurohospitalist  [facility]    Disclaimer: This record was dictated using Dragon software. There may be errors due to voice recognition problems that were not realized and corrected during the completion of the note.           [1]   Allergies  Allergen Reactions   • Codeine [Opioid Analgesics] NAUSEA AND VOMITING

## 2025-01-10 NOTE — PHYSICAL THERAPY NOTE
PHYSICAL THERAPY EVALUATION - INPATIENT     Room Number: 368/368-A  Evaluation Date: 1/10/2025  Type of Evaluation: Initial  Physician Order: PT Eval and Treat    Presenting Problem: pneumonia, weakness, cough  Co-Morbidities : MS, stroke, history of UTI, dementia  Reason for Therapy: Mobility Dysfunction and Discharge Planning    PHYSICAL THERAPY ASSESSMENT   Patient is a 79 year old female admitted 1/9/2025 for pneumonia, weakness, and coughing.  Prior to admission, patient's baseline is close SBA to CGA with RW.  Patient is currently functioning near baseline with bed mobility, transfers, and gait.  Patient is requiring minimal assist as a result of the following impairments: decreased functional strength, decreased endurance/aerobic capacity, impaired static and dynamic sitting and standing balance, and decreased muscular endurance.  Physical Therapy will continue to follow for duration of hospitalization.    Patient will benefit from continued skilled PT Services at discharge to promote prior level of function and safety with additional support and return home with OP PT.    PLAN DURING HOSPITALIZATION  Nursing Mobility Recommendation : 1 Assist  PT Device Recommendation: Rolling walker  PT Treatment Plan: Bed mobility;Body mechanics;Coordination;Endurance;Energy conservation;Patient education;Family education;Gait training;Strengthening;Transfer training;Balance training  Rehab Potential : Fair  Frequency (Obs): 3x/week     CURRENT GOALS    Goal #1 Patient is able to demonstrate supine - sit EOB @ level: supervision     Goal #2 Patient is able to demonstrate transfers Sit to/from Stand at assistance level: supervision     Goal #3 Patient is able to ambulate 50 feet with assist device: walker - rolling at assistance level: supervision     Goal #4    Goal #5    Goal #6    Goal Comments: Goals established on 1/10/2025      PHYSICAL THERAPY MEDICAL/SOCIAL HISTORY  History related to current admission: Patient is a  79 year old female admitted on 2025 from home for weakness and cough.  Pt diagnosed with pneumonia.    HOME SITUATION  Type of Home: House  Home Layout: One level  Stairs to Enter : 1   Railing: No    Stairs to Bedroom: 0    Railing: No    Lives With: Daughter    Drives: No   Patient Regularly Uses: Rolling walker      Prior Level of [place]: Patient lives in a 1 story home with 1 step to enter and no railing. She lives with her daughter, who is always present and providing supervision. She uses an RW with CGA from daughter. She uses a whistle to call her daughter when she needs assistance. She has had 2 falls in the last 6 months. Patient is currently participating in maintenance outpatient PT at [facility] for management of MS.    SUBJECTIVE  \"I have slippers like Carlie\"    OBJECTIVE  Precautions: Bed/chair alarm  Fall Risk: Standard fall risk    WEIGHT BEARING RESTRICTION     PAIN ASSESSMENT  Ratin  Location: patient denies pain at this time       COGNITION  Overall Cognitive Status:  pleasantly confused  Memory:  decreased recall of biographical information, decreased recall of recent events, and decreased long term memory    RANGE OF MOTION AND STRENGTH ASSESSMENT  Upper extremity ROM and strength are within functional limits     Lower extremity ROM is within functional limits     Lower extremity strength is within functional limits     BALANCE  Static Sitting: Fair -  Dynamic Sitting: Fair -  Static Standing: Fair -  Dynamic Standing: Fair -    ADDITIONAL TESTS                                    ACTIVITY TOLERANCE                         O2 WALK       NEUROLOGICAL FINDINGS                        AM-PAC '6-Clicks' INPATIENT SHORT FORM - BASIC MOBILITY  How much difficulty does the patient currently have...  Patient Difficulty: Turning over in bed (including adjusting bedclothes, sheets and blankets)?: A Little   Patient Difficulty: Sitting down on and standing up from a chair with arms (e.g.,  wheelchair, bedside commode, etc.): A Little   Patient Difficulty: Moving from lying on back to sitting on the side of the bed?: A Little   How much help from another person does the patient currently need...   Help from Another: Moving to and from a bed to a chair (including a wheelchair)?: A Little   Help from Another: Need to walk in hospital room?: A Little   Help from Another: Climbing 3-5 steps with a railing?: A Lot     AM-PAC Score:  Raw Score: 17   Approx Degree of Impairment: 50.57%   Standardized Score (AM-PAC Scale): 42.13   CMS Modifier (G-Code): CK    FUNCTIONAL ABILITY STATUS  Gait Assessment   Functional Mobility/Gait Assessment  Gait Assistance: Contact guard assist  Distance (ft): 25  Assistive Device: Rolling walker  Pattern: Within Functional Limits (decreased rolanda)    Skilled Therapy Provided   Educated on importance of out of bed mobility and ambulation    Bed mobility> sit to stand to RW> ambulated 25 feet> patient upright in chair at end of session  Bed Mobility:  Rolling: Ely  Supine to sit: Ely   Sit to supine: NT     Transfer Mobility:  Sit to stand: Ely to RW with VC for hand placement   Stand to sit: Ely from RW with VC for hand placement  Gait = Ely with RW for 25 feet, gait pattern WNL, decreased rolanda, hand over hand assist for RW management    Therapist's Comments: RN gave clearance to see patient. Discussed role and goal of physical therapy in hospital setting. Pt in agreement to session.       Exercise/Education Provided:  Bed mobility  Body mechanics  Energy conservation  Functional activity tolerated  Gait training  Transfer training    Patient End of Session: Up in chair;Needs met;Call light within reach;RN aware of session/findings;Hospital anti-slip socks;Alarm set;Family present      Patient Evaluation Complexity Level:  History Moderate - 1 or 2 personal factors and/or co-morbidities   Examination of body systems Low -  addressing 1-2 elements   Clinical  Presentation Low- Stable   Clinical Decision Making Low Complexity       PT Session Time: 20 minutes  Therapeutic Activity: 10 minutes

## 2025-01-10 NOTE — ED PROVIDER NOTES
Patient Seen in: [facility] Emergency Department      History     Chief Complaint   Patient presents with    Stroke     Stated Complaint: Stroke    Subjective:   HPI      Patient is a 79-year-old female presents to ED for evaluation of generalized weakness, cough.  History obtained by EMS stated she had more weakness tonight.  Daughter who was not present upon initial evaluation.  EMS thought she was leaning to the right while sitting in a chair.  Stroke alert was called prior to arrival.  Patient states she think she has a urinary tract infection although has no urinary symptoms.  She does have history of multiple sclerosis.  Patient complains of weakness since Saturday which was 5 days ago.  She denies any fever.  Daughter who presents later in the workup states she has a slight cough and was taken to immediate care yesterday had negative COVID testing.  Did not get an RSV result.  Daughter states she has had weakness for 2 days.  She normally walks with a walker with daughter's assistance.  Tonight, 1 hour ago daughter could not get her up out of the chair.  Daughter states she has a history of prior stroke in the past on aspirin.  Objective:     No pertinent past medical history.            No pertinent past surgical history.              No pertinent social history.                Physical Exam     ED Triage Vitals [01/09/25 2241]   /76   Pulse 101   Resp 22   Temp 99.7 °F (37.6 °C)   Temp src Temporal   SpO2 96 %   O2 Device None (Room air)       Current Vitals:   Vital Signs  BP: 135/54  Pulse: 92  Resp: 18  Temp: 98.3 °F (36.8 °C)  Temp src: Oral  MAP (mmHg): 80    Oxygen Therapy  SpO2: 92 %  O2 Device: None (Room air)  Pulse Oximetry Type: Continuous        Physical Exam  Constitutional: Patient is an elderly female who is in no acute distress.  She is alert to name.  She does not know the place or year.  She thinks it is 1944.  She states she has 52  HEENT: Head normal cephalic/atraumatic.   Mucous membranes dry.  Pupils are miotic.  Neither eye crosses the midline medially but she is able to look laterally.  Lungs: Clear  Cardiovascular: Regular rate and rhythm, normal sinus  Abdominal: Soft, nontender, nondistended  Skin: Warm and dry  Neurological: Bilateral upper extremity strength 5/5.  Lower extremity strength 4/5 on the left and 3/5 on the right.  Her speech is intact.  Sensation intact.      ED Course     Labs Reviewed   CBC WITH DIFFERENTIAL WITH PLATELET - Abnormal; Notable for the following components:       Result Value    Lymphocyte Absolute 0.76 (*)     All other components within normal limits   COMP METABOLIC PANEL (14) - Abnormal; Notable for the following components:    Glucose 107 (*)     Sodium 135 (*)     Creatinine 1.03 (*)     Calcium, Total 10.6 (*)     eGFR-Cr 55 (*)     All other components within normal limits   URINALYSIS WITH CULTURE REFLEX - Abnormal; Notable for the following components:    Urine Color Colorless (*)     Spec Gravity >1.030 (*)     Nitrite Urine 2+ (*)     Leukocyte Esterase Urine 250 (*)     WBC Urine 21-50 (*)     Bacteria Urine Rare (*)     Squamous Epi. Cells Few (*)     All other components within normal limits   PROCALCITONIN - Abnormal; Notable for the following components:    Procalcitonin 0.08 (*)     All other components within normal limits   BASIC METABOLIC PANEL (8) - Abnormal; Notable for the following components:    Glucose 142 (*)     All other components within normal limits   POCT GLUCOSE - Abnormal; Notable for the following components:    POC Glucose 106 (*)     All other components within normal limits   TROPONIN I HIGH SENSITIVITY - Normal   LACTIC ACID, PLASMA - Normal   PRO BETA NATRIURETIC PEPTIDE - Normal   SARS-COV-2/FLU A AND B/RSV BY PCR (GENEXPERT) - Normal    Narrative:     This test is intended for the qualitative detection and differentiation of SARS-CoV-2, influenza A, influenza B, and respiratory syncytial virus (RSV)  viral RNA in nasopharyngeal or nares swabs from individuals suspected of respiratory viral infection consistent with COVID-19 by their healthcare provider. Signs and symptoms of respiratory viral infection due to SARS-CoV-2, influenza, and RSV can be similar.    Test performed using the Xpert Xpress SARS-CoV-2/FLU/RSV (real time RT-PCR)  assay on the GeneXpert instrument, [employer], [place] [address].   This test is being used under the Food and Drug Administration's Emergency Use Authorization.    The authorized Fact Sheet for Healthcare Providers for this assay is available upon request from the laboratory.   RAINBOW DRAW LAVENDER   RAINBOW DRAW LIGHT GREEN   RAINBOW DRAW BLUE   BLOOD CULTURE   BLOOD CULTURE   URINE CULTURE, ROUTINE   SPUTUM CULTURE   Sodium 135.  Urinalysis is positive for infection  EKG    Rate, intervals and axes as noted on EKG Report.  Rate: 105  Rhythm: Sinus Rhythm  Reading: Sinus tachycardia.  No acute changes           I personally reviewed xray films of chest and independent interpretation shows subtle right lower lobe pneumonia.  I also reviewed formal xray report as read by radiology with findings below: Xray of chest read by vision rad radiology shows cardiomegaly with central vascular congestion and mild pulmonary edema.  Asymmetric right perihilar prominence may represent volume overload or pneumonia        CT STROKE CTA BRAIN/CTA NECK (W IV)(CPT=70496/09486)    Result Date: 1/9/2025  PROCEDURE:  CT STROKE CTA BRAIN/CTA NECK (W IV)(CPT=70496/10205)  COMPARISON:  ED , CT, CT STROKE BRAIN (NO IV)(CPT=70450), 1/09/2025, 10:56 PM.  INDICATIONS:  eval for stroke, confusion, right-sided weakness  TECHNIQUE  Noncontrast CT scanning is performed through the brain and CT angiography of the head and neck were obtained with non-ionic contrast. MIP images were obtained. Curved planar reformats were performed through the carotid and vertebral arteries. All measurements obtained in this exam  were performed using NASCET. Dose reduction techniques were used. Dose information is transmitted to the ACR (American College of Radiology) NRDR (National Radiology Data Registry) which includes the Dose Index Registry.  PATIENT STATED HISTORY:(As transcribed by Technologist)  Confusion, right side weakness. Last known normal 2130. History of stroke with residual right weakness and multiple sclerosis.   CONTRAST USED:  75cc of Isovue 370  FINDINGS:   CTA neck:  There is a 3 vessel aortic arch with separate origins of the right brachiocephalic, left common carotid and left subclavian artery.  Motion artifact somewhat limits assessment.  There is no evidence of hemodynamically significant stenosis at either carotid bifurcation based on NASCET criteria.  Vertebral arteries appear codominant.  No evidence of significant stenosis or occlusion.  Paravertebral soft tissues are unremarkable in appearance.  CTA head:  The bilateral high cervical, petrous, cavernous and supraclinoid ICA segments appear patent.  Mild calcific atherosclerosis is present of the cavernous internal carotid arteries bilaterally.  There is no evidence of large vessel intracranial arterial occlusion.  The distal branches of the bilateral anterior and middle cerebral arteries are unremarkable.  Basilar artery is normal in caliber.  The distal branches of the bilateral posterior cerebral arteries are unremarkable.  A hypervascular focus is present within the left frontal lobe near the vertex (image 592 of series 5. This is somewhat incompletely characterized but could represent a cavernoma.  This measures approximately 5 mm in diameter.             CONCLUSION:  There is no evidence of large vessel intracranial arterial occlusion.  If there is persistent concern for acute ischemia then recommend MRI.  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  No evidence of hemodynamically significant stenosis at either carotid  bifurcation based on NASCET criteria.   LOCATION:  [facility]   Dictated by (CST): Tomasz Mcneil MD on 1/09/2025 at 11:12 PM     Finalized by (CST): Tomasz Mcneil MD on 1/09/2025 at 11:29 PM       CT STROKE BRAIN (NO IV)(CPT=70450)    Result Date: 1/9/2025  PROCEDURE:  CT STROKE BRAIN (NO IV)(CPT=70450)  COMPARISON:  None.  INDICATIONS:  Confusion  TECHNIQUE:  Noncontrast CT scanning is performed through the brain.  Dose reduction techniques were used. Dose information is transmitted to the ACR (American College of Radiology) NRDR (National Radiology Data Registry) which includes the Dose Index Registry.  PATIENT STATED HISTORY: (As transcribed by Technologist)  Confusion, right side weakness. Last known normal 2130. History of stroke with residual right weakness and multiple sclerosis.    FINDINGS:  VENTRICLES/SULCI:  Ventricles and sulci are normal in size.  INTRACRANIAL:  There are no abnormal extraaxial fluid collections.  There is no midline shift.  Mild chronic microvascular ischemic changes are favored within the white matter.  There is nothing specific for acute infarct.  There is no hemorrhage or mass  lesion.  SINUSES:           No sign of acute sinusitis.  MASTOIDS:          No sign of acute inflammation. SKULL:             No evidence for fracture or osseous abnormality. OTHER:             None.            CONCLUSION:  No significant midline shift or mass effect.  No acute intracranial hemorrhage.  Mild chronic microvascular ischemic changes are likely present within the white matter.  If there is persistent clinical concern then consider MRI.   Critical results were called to Dr. Tuttle at 2304 hours on 1/9/25.  There was appropriate read back.    LOCATION:  [facility]   Dictated by (CST): Tomasz Mcneil MD on 1/09/2025 at 11:02 PM     Finalized by (CST): Tomasz Mcneil MD on 1/09/2025 at 11:05 PM          Medications   azithromycin (Zithromax) 500 mg in sodium chloride 0.9% 250mL IVPB premix (has no administration in time  range)   albuterol (Ventolin) (2.5 MG/3ML) 0.083% nebulizer solution 2.5 mg (has no administration in time range)   enoxaparin (Lovenox) 40 MG/0.4ML SUBQ injection 40 mg (has no administration in time range)   acetaminophen (Tylenol Extra Strength) tab 500 mg (has no administration in time range)   melatonin tab 3 mg (has no administration in time range)   polyethylene glycol (PEG 3350) (Miralax) 17 g oral packet 17 g (has no administration in time range)   sennosides (Senokot) tab 17.2 mg (has no administration in time range)   bisacodyl (Dulcolax) 10 MG rectal suppository 10 mg (has no administration in time range)   fleet enema (Fleet) rectal enema 133 mL (has no administration in time range)   ondansetron (Zofran) 4 MG/2ML injection 4 mg (has no administration in time range)   benzonatate (Tessalon) cap 200 mg (has no administration in time range)   glycerin-hypromellose- (Artificial Tears) 0.2-0.2-1 % ophthalmic solution 1 drop (has no administration in time range)   sodium chloride (Saline Mist) 0.65 % nasal solution 1 spray (has no administration in time range)   metoclopramide (Reglan) 5 mg/mL injection 5 mg (has no administration in time range)   cefTRIAXone (Rocephin) 2 g in sodium chloride 0.9% 100 mL IVPB-ADDV (has no administration in time range)   aspirin tab 325 mg (has no administration in time range)   atorvastatin (Lipitor) tab 20 mg ( Oral Canceled Entry 1/10/25 0330)   pantoprazole (Protonix) DR tab 20 mg (0 mg Oral Hold 1/10/25 0700)   folic acid (Folvite) tab 400 mcg (has no administration in time range)   Mirabegron ER (Myrbetriq) 50 MG tablet TB24 50 mg ( Oral Automatically Held 1/13/25 0930)   acidophilus (Probiotic) cap/tab 1 capsule (has no administration in time range)   tiZANidine (Zanaflex) tab 2 mg (has no administration in time range)   iopamidol 76% (ISOVUE-370) injection for power injector (75 mL Intravenous Given 1/9/25 4199)   acetaminophen (Tylenol) rectal suppository 650  mg (650 mg Rectal Given 1/10/25 0028)   sodium chloride 0.9 % IV bolus 1,000 mL (1,000 mL Intravenous Handoff 1/10/25 0140)   cefTRIAXone (Rocephin) 2 g in sodium chloride 0.9% 100 mL IVPB-ADDV (0 g Intravenous Stopped 1/10/25 0132)   azithromycin (Zithromax) 500 mg in sodium chloride 0.9% 250mL IVPB premix (500 mg Intravenous Handoff 1/10/25 0140)   methylPREDNISolone sodium succinate (Solu-MEDROL) 1,000 mg in sodium chloride 0.9% 100 mL IVPB (1,000 mg Intravenous Handoff 1/10/25 0140)            MDM      Patient is a 79-year-old female presents to ED for evaluation of generalized weakness, cough.  Differential urinary tract infection, MS exacerbation, pneumonia.  Generalized weakness without focal findings.  Stroke alert called in the field.  She did undergo CTA head and neck per stroke protocol which was negative.  Symptoms are not consistent with stroke.  No indication for tPA.  Patient laboratory test obtained showing urinary tract infection.  Given IV Rocephin.  Sodium 135.  Rest of labs unremarkable.  Troponin normal.  Lactic acid normal.  Chest x-ray showing potential pneumonia.  Given Zithromax as well as the Rocephin.  High dose Solu-Medrol 1 g was given for MS exacerbation.  Case discussed with hospitalist.  Will have neurology consult in the morning.    Admission disposition: 1/10/2025 12:59 AM       Independent source(s) of information include daughter    Medical Decision Making      Disposition and Plan     Clinical Impression:  1. Weakness generalized    2. Multiple sclerosis exacerbation (HCC)    3. Community acquired pneumonia of right lower lobe of lung    4.  Urinary tract infection    Disposition:  Admit  1/10/2025 12:59 am    Follow-up:  No follow-up provider specified.        Medications Prescribed:  Current Discharge Medication List              Supplementary Documentation:         Hospital Problems       Present on Admission  Date Reviewed: 2/15/2022            ICD-10-CM Noted POA    *  (Principal) Weakness generalized R53.1 1/9/2025 Unknown    Community acquired pneumonia of right lower lobe of lung J18.9 1/10/2025 Unknown    Multiple sclerosis exacerbation (HCC) G35 1/10/2025 Unknown         TNK/ NI Documentation:    Date/Time last known well:   N/A    NIHSS on presentation: N/A     Chief Complaint   Patient presents with    Stroke     IV Tenecteplase (TNK) administered: No; Patient is not a Candidate for IV TNK due to: Out of time window period or time of onset unknown    Candidate for Endovascular thrombectomy (EVT): No; Patient is not a candidate for Endovascular Thrombectomy due to: No large vessel occlusion ( LVO)  on CTA/MRA imaging      Disposition: There is no disposition on file for this visit.

## 2025-01-10 NOTE — PLAN OF CARE
Patient alert and oriented x3, forgetful at times. VSS on RA. Nonproductive cough, denies shortness of breath. Mepilex to sacrum. SCDs in place, ankle pumps encouraged, IS encouraged. Voiding freely with purewick in place. Denies n/v.     Plan: IV abx, speech to see, PT/OT to see, neurology to see

## 2025-01-10 NOTE — ED QUICK NOTES
Orders for admission, patient is aware of plan and ready to go upstairs. Any questions, please call ED RN Naya at extension [phone].     Patient Covid vaccination status: Fully vaccinated     COVID Test Ordered in ED: SARS-CoV-2/Flu A and B/RSV by PCR (GeneXpert)    COVID Suspicion at Admission: N/A    Running Infusions:      Mental Status/LOC at time of transport: A&Ox3 and confused at times. Improving from arrival.    Other pertinent information: Npo. Daughter at bedside. All consults aware.    CIWA score: N/A   NIH score:  11

## 2025-01-11 PROBLEM — N30.00 ACUTE CYSTITIS WITHOUT HEMATURIA: Status: ACTIVE | Noted: 2025-01-11

## 2025-01-11 LAB
C DIFF TOX B STL QL: NEGATIVE
PLATELET # BLD AUTO: 199 10(3)UL (ref 150–450)

## 2025-01-11 PROCEDURE — 99232 SBSQ HOSP IP/OBS MODERATE 35: CPT | Performed by: HOSPITALIST

## 2025-01-11 NOTE — PROGRESS NOTES
[facility]   part of [facility]     Hospitalist Progress Note     Starla Ortiz Patient Status:  Inpatient    9/15/1945 MRN [id number]   Location [facility] 3SW-A Attending Lidia Martinez,    Hosp Day # 1 PCP GIFTY ABDI     Chief Complaint: Weakness     Subjective:     Patient seen examined bedside.  Patient started to cough up more sputum.  No overnight events new complaints.    Objective:    Review of Systems:   A comprehensive review of systems was completed; pertinent positive and negatives stated in subjective.    Vital signs:  Temp:  [97.8 °F (36.6 °C)-98.7 °F (37.1 °C)] 98.7 °F (37.1 °C)  Pulse:  [82-89] 89  Resp:  [16-18] 17  BP: (104-131)/(52-77) 125/54  SpO2:  [92 %-94 %] 92 %    Physical Exam:    General: No acute distress  Respiratory: No wheezes, no rhonchi  Cardiovascular: S1, S2, regular rate and rhythm  Abdomen: Soft, Non-tender, non-distended, positive bowel sounds  Neuro: No new focal deficits.   Extremities: No edema      Diagnostic Data:    Labs:  Recent Labs   Lab 25  0525   WBC 7.0  --    HGB 15.2  --    MCV 87.4  --    .0 199.0       Recent Labs   Lab 01/09/25  2307 01/10/25  0531   * 142*   BUN 15 13   CREATSERUM 1.03* 0.84   CA 10.6* 9.8   ALB 4.6  --    * 138   K 4.2 4.1    106   CO2 27.0 22.0   ALKPHO 97  --    AST 16  --    ALT 18  --    BILT 0.6  --    TP 7.4  --        Estimated Glomerular Filtration Rate: 70.8 mL/min/1.73m2 (by CKD-EPI based on SCr of 0.84 mg/dL).    Recent Labs   Lab 25   TROPHS 3       No results for input(s): \"PTP\", \"INR\" in the last 168 hours.               Microbiology    Hospital Encounter on 25   1. Blood Culture     Status: None (Preliminary result)    Collection Time: 01/10/25 12:40 AM    Specimen: Blood,peripheral   Result Value Ref Range    Blood Culture Result No Growth 1 Day N/A         Imaging: Reviewed in Epic.    Medications:    azithromycin  500 mg Intravenous  Q24H    enoxaparin  40 mg Subcutaneous Daily    cefTRIAXone  2 g Intravenous Q24H    aspirin  325 mg Oral Daily    atorvastatin  20 mg Oral Nightly    pantoprazole  20 mg Oral QAM AC    folic acid  400 mcg Oral Daily    [Held by provider] Mirabegron ER  50 mg Oral Daily    acidophilus  1 capsule Oral BID       Assessment & Plan:      #  Rt middle lobe Pneumonia   -Will continue antibiotics  -Prelim blood cultures negative    #UTI   -Continue antibiotics  -Urine culture pending    #MS exacerbation  -Possibly exacerbated by infection  -CT CTA head negative for any acute  -Patient back to baseline so neurology evaluated and does not feel patient needs any steroids.     #elevated serum creatinine  -Cr 1.03 with BUN 15: baseline 0.74 with GFR 79  -less than 1.5 x elevation  -cont to monitor BMP  -strict I/O, avoid nephrotoxins     #HLD  -cont home statin     #neurogenic bladder  -cont home meds     #GERD  -cont home PPI      Shailesh West,     Supplementary Documentation:     Quality:  DVT Mechanical Prophylaxis:   SCDs,    DVT Pharmacologic Prophylaxis   Medication    enoxaparin (Lovenox) 40 MG/0.4ML SUBQ injection 40 mg    DVT Pharmacologic prophylaxis: Aspirin 325 mg           Code Status: Not on file  Watts: External urinary catheter in place  Watts Duration (in days):   Central line:    RIRI:     Discharge is dependent on: Improvement  At this point Ms. Ortiz is expected to be discharge to: TBD    The 21st Century Cures Act makes medical notes like these available to patients in the interest of transparency. Please be advised this is a medical document. Medical documents are intended to carry relevant information, facts as evident, and the clinical opinion of the practitioner. The medical note is intended as peer to peer communication and may appear blunt or direct. It is written in medical language and may contain abbreviations or verbiage that are unfamiliar.               **Certification      PHYSICIAN Certification of Need for Inpatient Hospitalization - Initial Certification    Patient will require inpatient services that will reasonably be expected to span two midnight's based on the clinical documentation in H+P.   Based on patients current state of illness, I anticipate that, after discharge, patient will require TBD.

## 2025-01-11 NOTE — PLAN OF CARE
Pt A&Ox3-4, pleasantly confused. VSS on RA, IS encouraged. SCDs and lovenox. Denies pain at this time. Up with 1 assist and the walker. Voiding via purewick, last BM 1/10. Regular diet - minced with thin liquids per SLP, meds taken crushed with applesauce. OT to see.Plan to continue IV abx - cx pending. Call light in reach, safety measures in place.

## 2025-01-11 NOTE — PLAN OF CARE
A&Ox3, sleepy this morning. Up to chair with therapy, tolerated well. Tolerating diet. Stool sample for C.diff, pending results. Continue IV ABX.     Copy of Polst form placed on patients chart, MD aware.

## 2025-01-11 NOTE — OCCUPATIONAL THERAPY NOTE
OCCUPATIONAL THERAPY EVALUATION - INPATIENT     Room Number: 368/368-A  Evaluation Date: 1/11/2025  Type of Evaluation: Initial  Presenting Problem: Generalized weakness, cough, pneumonia    Physician Order: IP Consult to Occupational Therapy  Reason for Therapy: ADL/IADL Dysfunction and Discharge Planning    OCCUPATIONAL THERAPY ASSESSMENT   Patient is currently functioning near baseline with toileting, lower body dressing, bed mobility, transfers, static sitting balance, dynamic sitting balance, static standing balance, dynamic standing balance, maintaining seated position, functional standing tolerance, energy conservation strategies, and aerobic capacity. Prior to admission, patient's baseline is assist with all ADLs from daughter.  Patient is requiring maximum assistance as a result of the following impairments: decreased functional strength, decreased endurance, and impaired standing balance. Occupational Therapy will continue to follow for duration of hospitalization.    Patient will benefit from continued skilled OT Services for duration of hospitalization, however, given the patient is functioning near baseline level do not anticipate skilled therapy needs at discharge     History Related to Current Admission: Patient is a 79 year old female admitted on 1/9/2025 with Presenting Problem: Generalized weakness, cough, pneumonia. Co-Morbidities : MS, stroke, history of UTI, dementia    WEIGHT BEARING RESTRICTION       Recommendations for nursing staff:   Transfers: up x 1 assist, min-modA, RW  Toileting location: Bedside commode    EVALUATION SESSION:  Patient Start of Session: seated upright in chair    FUNCTIONAL TRANSFER ASSESSMENT  Sit to Stand: Chair  Chair: Moderate Assist  Commode Transfer: Moderate Assist    BED MOBILITY  Supine to Sit : Not tested  Sit to Supine (OT): Moderate Assist    BALANCE ASSESSMENT  Static Sitting: Supervision  Static Standing: Moderate Assist  Dynamic Sitting:  Supervision  Dynamic Standing: Moderate Assist    FUNCTIONAL ADL ASSESSMENT  LB Dressing Seated: Maximum Assist (donned brief and socks sitting on commode)  LB Dressing Standing: Maximum Assist (donned brief standing at commode)  Toileting Seated: Stand-by Assist  Toileting Standing: Maximum Assist    ACTIVITY TOLERANCE: Pt tolerated static standing activity during standing toileting task; pt demonstrated ability to complete 3x sit <> stand from bedside commode and maintain static standing balance with modA and RW during posterior pericare ~1 min before requiring seated rest break.                          O2 SATURATIONS       COGNITION  Overall Cognitive Status:  WFL - within functional limits    Upper Extremity   ROM: within functional limits  Strength: within functional limits      EDUCATION PROVIDED  Patient Education : Role of Occupational Therapy; Plan of Care; DME Recommendations; Functional Transfer Techniques; Fall Prevention; Posture/Positioning; Energy Conservation; Proper Body Mechanics  Patient's Response to Education: Verbalized Understanding; Requires Further Education  Family/Caregiver Education : -- (Same)  Family/Caregiver's Response to Education: Verbalized Understanding    Equipment used: bedside commode, RW  Demonstrates functional use     Therapist comments: Pt received sitting upright in chair, daughter in room, reporting pt needs to use bathroom for BM. Pt assisted to bedside commode, completed lengthy toileting task with use of bedside commode and with additional assist from daughter. Once finished with toileting task, pt safely transferred to chair, however then requesting to lie back down in bed. Pt assisted to semi supine in bed with modA. Pt daughter very helpful throughout session.    Patient End of Session: In bed;Needs met;Call light within reach;RN aware of session/findings;All patient questions and concerns addressed;Hospital anti-slip socks;Family present    OCCUPATIONAL  PROFILE    HOME SITUATION  Type of Home: House  Home Layout: One level  Lives With: Daughter    Toilet and Equipment: Comfort height toilet  Shower/Tub and Equipment: Tub-shower combo;Grab bar;Tub transfer bench  Other Equipment:  (RW, transport wheelchair)          Drives: No  Patient Regularly Uses: Rolling walker    Prior Level of Function: Per daughter report, pt lives at home with supportive daughter, daughter assists with all ADLs, pt is able to provide light assist with daughter occasionally, uses a RW for mobility, has a transport wheelchair for out in community.    SUBJECTIVE   \"She better know what she's doing!\"    PAIN ASSESSMENT  Ratin          OBJECTIVE  Precautions: Bed/chair alarm  Fall Risk: Standard fall risk    ASSESSMENTS    AM-PAC ‘6-Clicks’ Inpatient Daily Activity Short Form  -   Putting on and taking off regular lower body clothing?: A Lot  -   Bathing (including washing, rinsing, drying)?: A Lot  -   Toileting, which includes using toilet, bedpan or urinal? : A Lot  -   Putting on and taking off regular upper body clothing?: A Little  -   Taking care of personal grooming such as brushing teeth?: A Little  -   Eating meals?: None    AM-PAC Score:  Score: 16  Approx Degree of Impairment: 53.32%  Standardized Score (AM-PAC Scale): 35.96    ADDITIONAL TESTS     NEUROLOGICAL FINDINGS      COGNITION ASSESSMENTS     PLAN  OT Device Recommendations: 3-in-1 commode  OT Treatment Plan: Balance activities;Energy conservation/work simplification techniques;ADL training;IADL training;Functional transfer training;UE strengthening/ROM;Endurance training;Patient/Family education;Patient/Family training;Equipment eval/education;Compensatory technique education;Continued evaluation  Rehab Potential : Good  Frequency: 3-5x/week  Number of Visits to Meet Established Goals: 3    ADL Goals   Patient will perform upper body dressing:  with min assist  Patient will perform toileting: with mod  assist    Functional Transfer Goals  Patient will transfer from sit to stand:  with min assist  Patient will transfer to toilet:  with min assist    Patient Evaluation Complexity Level:   Occupational Profile/Medical History LOW - Brief history including review of medical or therapy records    Specific performance deficits impacting engagement in ADL/IADL MODERATE  3 - 5 performance deficits   Client Assessment/Performance Deficits MODERATE - Comorbidities and min to mod modifications of tasks    Clinical Decision Making LOW - Analysis of occupational profile, problem-focused assessments, limited treatment options    Overall Complexity LOW     OT Session Time: 37 minutes  Self-Care Home Management: 25 minutes

## 2025-01-12 LAB
ATRIAL RATE: 105 BPM
P AXIS: 85 DEGREES
P-R INTERVAL: 190 MS
Q-T INTERVAL: 326 MS
QRS DURATION: 114 MS
QTC CALCULATION (BEZET): 430 MS
R AXIS: -33 DEGREES
T AXIS: 12 DEGREES
VENTRICULAR RATE: 105 BPM

## 2025-01-12 PROCEDURE — 99232 SBSQ HOSP IP/OBS MODERATE 35: CPT | Performed by: HOSPITALIST

## 2025-01-12 NOTE — PROGRESS NOTES
[facility]   part of Located within [facility]     Hospitalist Progress Note     Starla Ortiz Patient Status:  Inpatient    9/15/1945 MRN [id number]   Location [facility] 3SW-A Attending Lidia Martinez, DO   Hosp Day # 2 PCP GIFTY ABDI     Chief Complaint: Weakness     Subjective:     Patient seen examined bedside.  Patient started to cough up more sputum.  No overnight events new complaints.    Objective:    Review of Systems:   A comprehensive review of systems was completed; pertinent positive and negatives stated in subjective.    Vital signs:  Temp:  [98.4 °F (36.9 °C)-98.9 °F (37.2 °C)] 98.7 °F (37.1 °C)  Pulse:  [67-79] 75  Resp:  [16-18] 16  BP: (128-142)/(55-94) 142/94  SpO2:  [92 %-95 %] 94 %    Physical Exam:    General: No acute distress  Respiratory: No wheezes, no rhonchi  Cardiovascular: S1, S2, regular rate and rhythm  Abdomen: Soft, Non-tender, non-distended, positive bowel sounds  Neuro: No new focal deficits.   Extremities: No edema      Diagnostic Data:    Labs:  Recent Labs   Lab 25  0525   WBC 7.0  --    HGB 15.2  --    MCV 87.4  --    .0 199.0       Recent Labs   Lab 01/09/25  2307 01/10/25  0531   * 142*   BUN 15 13   CREATSERUM 1.03* 0.84   CA 10.6* 9.8   ALB 4.6  --    * 138   K 4.2 4.1    106   CO2 27.0 22.0   ALKPHO 97  --    AST 16  --    ALT 18  --    BILT 0.6  --    TP 7.4  --        Estimated Glomerular Filtration Rate: 70.8 mL/min/1.73m2 (by CKD-EPI based on SCr of 0.84 mg/dL).    Recent Labs   Lab 25   TROPHS 3       No results for input(s): \"PTP\", \"INR\" in the last 168 hours.               Microbiology    Hospital Encounter on 25   1. Blood Culture     Status: None (Preliminary result)    Collection Time: 01/10/25 12:40 AM    Specimen: Blood,peripheral   Result Value Ref Range    Blood Culture Result No Growth 2 Days N/A   2. Urine Culture, Routine     Status: Abnormal    Collection Time: 25 11:47 PM     Specimen: Urine, clean catch   Result Value Ref Range    Urine Culture 50,000-99,000 CFU/ML Klebsiella pneumoniae (A) N/A       Susceptibility    Klebsiella pneumoniae -  (no method available)     Ampicillin  Resistant      Ampicillin + Sulbactam <=2 Sensitive      Cefazolin <=4 Sensitive      Ciprofloxacin <=0.25 Sensitive      Gentamicin <=1 Sensitive      Meropenem <=0.25 Sensitive      Levofloxacin <=0.12 Sensitive      Nitrofurantoin 32 Sensitive      Piperacillin + Tazobactam <=4 Sensitive      Trimethoprim/Sulfa <=20 Sensitive          Imaging: Reviewed in Epic.    Medications:    enoxaparin  40 mg Subcutaneous Daily    cefTRIAXone  2 g Intravenous Q24H    aspirin  325 mg Oral Daily    atorvastatin  20 mg Oral Nightly    pantoprazole  20 mg Oral QAM AC    folic acid  400 mcg Oral Daily    Mirabegron ER  50 mg Oral Daily    acidophilus  1 capsule Oral BID       Assessment & Plan:      #  Rt middle lobe Pneumonia   -Will continue antibiotics  -Prelim blood cultures negative    # UTI   -Continue antibiotics  -Urine culture growing Klebsiella resistant only to ampicillin.  Will switch to Keflex upon discharge.    # MS exacerbation  -Possibly exacerbated by infection  -CT CTA head negative for any acute  -Patient back to baseline so neurology evaluated and does not feel patient needs any steroids.     # elevated serum creatinine  -Cr 1.03 with BUN 15: baseline 0.74 with GFR 79  -less than 1.5 x elevation  -cont to monitor BMP  -strict I/O, avoid nephrotoxins     # HLD  -cont home statin     # neurogenic bladder  -cont home meds     # GERD  -cont home PPI      Shailesh West, DO    Supplementary Documentation:     Quality:  DVT Mechanical Prophylaxis:   SCDs,    DVT Pharmacologic Prophylaxis   Medication    enoxaparin (Lovenox) 40 MG/0.4ML SUBQ injection 40 mg    DVT Pharmacologic prophylaxis: Aspirin 325 mg           Code Status: Not on file  Watts: External urinary catheter in place  Watts Duration  (in days):   Central line:    RIRI:     Discharge is dependent on: Improvement  At this point Ms. Ortiz is expected to be discharge to: TBD    The 21st Century Cures Act makes medical notes like these available to patients in the interest of transparency. Please be advised this is a medical document. Medical documents are intended to carry relevant information, facts as evident, and the clinical opinion of the practitioner. The medical note is intended as peer to peer communication and may appear blunt or direct. It is written in medical language and may contain abbreviations or verbiage that are unfamiliar.              **Certification      PHYSICIAN Certification of Need for Inpatient Hospitalization - Initial Certification    Patient will require inpatient services that will reasonably be expected to span two midnight's based on the clinical documentation in H+P.   Based on patients current state of illness, I anticipate that, after discharge, patient will require TBD.

## 2025-01-12 NOTE — PLAN OF CARE
Patient a/ox 2-3, forgetful at times. Calm and cooperative this shift. Denies pain. Occasional cough with scant clear sputum.. voiding via purewick, no complications.   Urine cx positive for klebsiella, continuing current abx with plans to transition to keflex for DC.Up to chair with assist this shift, daughter at bedside.   PT/OT to see 1/13/25, plans for possible home tomorrow.

## 2025-01-12 NOTE — PLAN OF CARE
Alert and Oriented x3-4, forgetful. On RA. VSS. Denies pain at this time. Denies N/T. Voiding freely via purwick. Tolerating diet. Denies N/V. Call light within reach at this time.    Plan: IV Abx, Bcx/Urine cx pending, PT rec home with OP PT

## 2025-01-13 VITALS
HEART RATE: 79 BPM | SYSTOLIC BLOOD PRESSURE: 148 MMHG | HEIGHT: 64.49 IN | WEIGHT: 164 LBS | BODY MASS INDEX: 27.66 KG/M2 | DIASTOLIC BLOOD PRESSURE: 73 MMHG | OXYGEN SATURATION: 92 % | RESPIRATION RATE: 16 BRPM | TEMPERATURE: 98 F

## 2025-01-13 LAB
ANION GAP SERPL CALC-SCNC: 5 MMOL/L (ref 0–18)
BUN BLD-MCNC: 10 MG/DL (ref 9–23)
CALCIUM BLD-MCNC: 9.5 MG/DL (ref 8.7–10.4)
CHLORIDE SERPL-SCNC: 109 MMOL/L (ref 98–112)
CO2 SERPL-SCNC: 25 MMOL/L (ref 21–32)
CREAT BLD-MCNC: 0.73 MG/DL
EGFRCR SERPLBLD CKD-EPI 2021: 84 ML/MIN/1.73M2 (ref 60–?)
ERYTHROCYTE [DISTWIDTH] IN BLOOD BY AUTOMATED COUNT: 12.3 %
GLUCOSE BLD-MCNC: 90 MG/DL (ref 70–99)
HCT VFR BLD AUTO: 39.8 %
HGB BLD-MCNC: 13.2 G/DL
MCH RBC QN AUTO: 29.5 PG (ref 26–34)
MCHC RBC AUTO-ENTMCNC: 33.2 G/DL (ref 31–37)
MCV RBC AUTO: 88.8 FL
OSMOLALITY SERPL CALC.SUM OF ELEC: 287 MOSM/KG (ref 275–295)
PLATELET # BLD AUTO: 177 10(3)UL (ref 150–450)
POTASSIUM SERPL-SCNC: 4 MMOL/L (ref 3.5–5.1)
RBC # BLD AUTO: 4.48 X10(6)UL
SODIUM SERPL-SCNC: 139 MMOL/L (ref 136–145)
WBC # BLD AUTO: 6.4 X10(3) UL (ref 4–11)

## 2025-01-13 RX ORDER — CEPHALEXIN 500 MG/1
500 CAPSULE ORAL 4 TIMES DAILY
Qty: 16 CAPSULE | Refills: 0 | Status: SHIPPED | OUTPATIENT
Start: 2025-01-13 | End: 2025-01-17

## 2025-01-13 NOTE — PHYSICAL THERAPY NOTE
PHYSICAL THERAPY TREATMENT NOTE - INPATIENT    Room Number: 368/368-A     Session: 1     Number of Visits to Meet Established Goals: 3    Presenting Problem: pneumonia, weakness, cough  Co-Morbidities : MS, stroke, history of UTI, dementia    PHYSICAL THERAPY ASSESSMENT   Patient demonstrates good  progress this session, goals  remain in progress.      Patient is requiring minimal assist and moderate assist as a result of the following impairments: decreased functional strength, decreased endurance/aerobic capacity, and decreased muscular endurance.     Patient continues to function below baseline with transfers, gait, and standing prolonged periods.  Next session anticipate patient to progress gait.  Physical Therapy will continue to follow patient for duration of hospitalization.    Patient continues to benefit from continued skilled PT services: at discharge to promote prior level of function.  Anticipate patient will return home with OP PT.    PLAN DURING HOSPITALIZATION  Nursing Mobility Recommendation : 1 Assist  PT Device Recommendation: Rolling walker  PT Treatment Plan: Bed mobility;Body mechanics;Coordination;Endurance;Energy conservation;Patient education;Family education;Gait training;Strengthening;Transfer training;Balance training  Frequency (Obs): 3x/week     CURRENT GOALS       Goal #1 Patient is able to demonstrate supine - sit EOB @ level: supervision      Goal #2 Patient is able to demonstrate transfers Sit to/from Stand at assistance level: supervision      Goal #3 Patient is able to ambulate 50 feet with assist device: walker - rolling at assistance level: supervision      Goal #4     Goal #5     Goal #6     Goal Comments: Goals established on 1/10/2025  2025 all goals ongoing     SUBJECTIVE  Pt is pleasant and agreeable to participate in therapy       OBJECTIVE  Precautions: Bed/chair alarm    WEIGHT BEARING RESTRICTION     PAIN ASSESSMENT   Ratin  Location: pt denies       BALANCE                                                                                                                        Static Sitting: Fair +  Dynamic Sitting: Fair           Static Standing: Poor +  Dynamic Standing: Poor    ACTIVITY TOLERANCE                         O2 WALK       AM-PAC '6-Clicks' INPATIENT SHORT FORM - BASIC MOBILITY  How much difficulty does the patient currently have...  Patient Difficulty: Turning over in bed (including adjusting bedclothes, sheets and blankets)?: None   Patient Difficulty: Sitting down on and standing up from a chair with arms (e.g., wheelchair, bedside commode, etc.): A Little   Patient Difficulty: Moving from lying on back to sitting on the side of the bed?: A Lot   How much help from another person does the patient currently need...   Help from Another: Moving to and from a bed to a chair (including a wheelchair)?: A Little   Help from Another: Need to walk in hospital room?: A Little   Help from Another: Climbing 3-5 steps with a railing?: A Lot     AM-PAC Score:  Raw Score: 17   Approx Degree of Impairment: 50.57%   Standardized Score (AM-PAC Scale): 42.13   CMS Modifier (G-Code): CK    FUNCTIONAL ABILITY STATUS  Gait Assessment   Functional Mobility/Gait Assessment  Gait Assistance: Moderate assistance  Distance (ft): 75,50  Assistive Device: Rolling walker  Pattern: Ataxic (NBOS)    Skilled Therapy Provided  Pt presents in bed, daughter present   Therapist educated pt on sequencing for t/f .  Pt gait trained in room and halls c RW min to mod A , cues for wider BART for B feet c good return demo.  Seated rest break provided.   Therapist cued pt for hand/foot placement for STS therex c good return demo.  Pt performed seated therex BLE for ROM and strengthening c visual cues and good return demo.     Bed Mobility:  Rolling: min A    Supine<>Sit: min A    Sit<>Supine: nt      Transfer Mobility:  Sit<>Stand: min A progressed to CGA    Stand<>Sit: CGA cues to reach back c poor  carryover    Gait: min to mod A c RW     Therapist's Comments: supportive daughter at BS       THERAPEUTIC EXERCISES  Lower Extremity Alternating marching  Ankle pumps  Knee extension  LAQ  STS      Upper Extremity      Position Sitting & Standing     Repetitions   5-10   Sets   1     Patient End of Session: Up in chair;Needs met;Call light within reach;RN aware of session/findings;All patient questions and concerns addressed;Family present    PT Session Time: 25 minutes  Gait Training: 15 minutes  Therapeutic Activity: 10 minutes  Therapeutic Exercise:  minutes   Neuromuscular Re-education:  minutes

## 2025-01-13 NOTE — PROGRESS NOTES
Received patient awake, alert and oriented x 3, forgetful at times with daughter at the bedside.  PT saw patient, recommending to continue OP therapy.  Patient x 1 assist with walker. Rocephin IV given for UTI.

## 2025-01-13 NOTE — SLP NOTE
SPEECH DAILY NOTE - INPATIENT    ASSESSMENT & PLAN   ASSESSMENT  Pt seen for dysphagia tx to assess tolerance with recommended diet, ensure appropriate utilization of aspiration precautions and provide pt/family education. Pt found sitting upright in bed; alert & participatory with daughter present in room. Patient being fed wafer for communion and daughter with several questions re: pt's diet at home. Observed patient with po trials of hard solid and small, single sips of water. Increased time required for mastication however complete with no significant oral residue appreciated and no overt clinical s/s of aspiration noted with either consistency. Reviewed and reiterated importance of modified diet and strict aspiration precautions to patient and daughter. Answered daughter's questions to her apparent satisfaction with good understanding reported. Patient and RN planning for discharge home. No further services warranted at this time. Will discharge from inpatient services at this time.    Diet Recommendations - Solids: Mechanical soft ground/ Minced & Moist  Diet Recommendations - Liquids: Thin Liquids (small, single controlled sips - no straws)       Aspiration Precautions: Upright position;Slow rate;Small bites;Small sips;No straw;1:1 feeding  Medication Administration Recommendations: Whole in puree;Crushed in puree    Patient Experiencing Pain: No                Treatment Plan  Treatment Plan/Recommendations: Dysphagia therapy;Aspiration precautions    Interdisciplinary Communication: Discussed with RN  daughter             GOALS  Goal #1 The patient will tolerate soft/minces & moist consistency and thin/small, controlled sips - no straw liquids without overt signs or symptoms of aspiration with 95 % accuracy over 2 session(s). Met   Goal #2 The patient/family/caregiver will demonstrate understanding and implementation of aspiration precautions and swallow strategies independently over 2 session(s).    Met    Goal #3 The patient will utilize compensatory strategies as outlined by  BSSE (clinical evaluation) including Slow rate, Small bites, Small sips, Alternate liquids/solids, No straws, Upright 90 degrees, Feed patient, Supervision with meals with as needed assistance 100 % of the time across 2 sessions. Met         FOLLOW UP  Follow Up Needed (Documentation Required): No  SLP Follow-up Date: 01/11/25  Duration: 1 week    Session: 1    If you have any questions, please contact Amalia NESS SLP    Amalia Fernandez MA, CCC-SLP  Pager [phone]

## 2025-01-13 NOTE — PLAN OF CARE
Alert and Oriented x3. On RA. VSS. Denies pain at this time. Denies N/T. Voiding freely. Tolerating diet. Denies N/V. Call light within reach at this time.    Plan: IV Abx, PT in the AM, PO abx upon discharge

## 2025-01-13 NOTE — PROGRESS NOTES
NURSING DISCHARGE NOTE    Discharged Home via Wheelchair.  Accompanied by Family member  Belongings Taken by patient/family. AVS printed and discussed with patient and daughter.  IV removed.

## 2025-01-13 NOTE — OCCUPATIONAL THERAPY NOTE
OCCUPATIONAL THERAPY TREATMENT NOTE - INPATIENT     Room Number: 368/368-A  Session: 1   Number of Visits to Meet Established Goals: 3    Presenting Problem: Generalized weakness, cough, pneumonia    HOME SITUATION  Type of Home: House  Home Layout: One level  Lives With: Daughter     Toilet and Equipment: Comfort height toilet  Shower/Tub and Equipment: Tub-shower combo;Grab bar;Tub transfer bench  Other Equipment:  (RW, transport wheelchair)     Drives: No  Patient Regularly Uses: Rolling walker     Prior Level of Function: Per daughter report, pt lives at home with supportive daughter, daughter assists with all ADLs, pt is able to provide light assist with daughter occasionally, uses a RW for mobility, has a transport wheelchair for out in community.    ASSESSMENT   Patient demonstrates good  progress this session, goals remain in progress.    Patient continues to function below baseline with  ADLs and functional transfers .   Contributing factors to remaining limitations include decreased functional strength, decreased functional reach, decreased endurance, impaired standing balance, impaired coordination, and decreased muscular endurance.  Next session anticipate patient to progress toileting, lower body dressing, bed mobility, transfers, static standing balance, dynamic standing balance, functional standing tolerance, and energy conservation strategies.  Occupational Therapy will continue to follow patient for duration of hospitalization.    Patient continues to benefit from continued skilled OT services: at discharge to promote prior level of function and safety with additional support and return home with home health OT.     History: Patient is a 79 year old female admitted on 1/9/2025 with Presenting Problem: Generalized weakness, cough, pneumonia. Co-Morbidities : MS, stroke, history of UTI, dementia    WEIGHT BEARING RESTRICTION       Recommendations for nursing staff:   Transfers: 1 person  Toileting  location: toilet    TREATMENT SESSION:  Patient Start of Session: semi supine in bed    FUNCTIONAL TRANSFER ASSESSMENT  Sit to Stand: Edge of Bed; Chair  Edge of Bed: Contact Guard Assist  Chair: Contact Guard Assist  Commode Transfer: Not Tested    BED MOBILITY  Supine to Sit : Moderate Assist  Sit to Supine (OT): Not Tested  Scooting: CGA    BALANCE ASSESSMENT  Static Sitting: Supervision  Static Standing: Moderate Assist    FUNCTIONAL ADL ASSESSMENT  LB Dressing Seated: Not Tested  LB Dressing Standing: Not Tested  Toileting Seated: Not Tested  Toileting Standing: Not Tested    ACTIVITY TOLERANCE: WFL                         O2 SATURATIONS       EDUCATION PROVIDED  Patient Education : Role of Occupational Therapy; Plan of Care; Discharge Recommendations; Functional Transfer Techniques; Fall Prevention; Posture/Positioning; Energy Conservation; Proper Body Mechanics  Patient's Response to Education: Verbalized Understanding; Requires Further Education  Family/Caregiver Education : -- (Same)  Family/Caregiver's Response to Education: Verbalized Understanding    Equipment used: RW  Demonstrates functional use, Would benefit from additional trial     THERAPEUTIC EXERCISES  Lower Extremity      Upper Extremity Shoulder raises  Forward punches  Elbow flexion/extension  Hand pumps     Position Sitting in chair     Repetitions 10   Sets 1     Therapist comments: Pt received semi supine in bed, pleasant and cooperative for OT session. Pt's daughter present at bedside. Pt agreeable for OOB activity in preparation for ADLs and functional transfers. Pt performs bed mobility at mod A to sit EOB with cues provided for hand placement and sequencing. Sit to stand transfer performed at min A/CGA with cues provided for hand placement. Pt engages in short distance functional mobility with RW requiring mod A. Cues provided for standing posture, feet placement, and balance. Cues provided for hand placement upon eccentric control. Pt  then engages in UE exercises to increase strength required for ADLs and functional transfers. Pt left in chair with all needs met.     Patient End of Session: Up in chair;Needs met;Call light within reach;RN aware of session/findings;All patient questions and concerns addressed;Hospital anti-slip socks;Alarm set;Family present    SUBJECTIVE  Pt pleasant and cooperative.    PAIN ASSESSMENT  Ratin  Location: denies        OBJECTIVE  Precautions: Bed/chair alarm    AM-PAC ‘6-Clicks’ Inpatient Daily Activity Short Form  -   Putting on and taking off regular lower body clothing?: A Lot  -   Bathing (including washing, rinsing, drying)?: A Lot  -   Toileting, which includes using toilet, bedpan or urinal? : A Lot  -   Putting on and taking off regular upper body clothing?: A Little  -   Taking care of personal grooming such as brushing teeth?: A Little  -   Eating meals?: None    AM-PAC Score:  Score: 16  Approx Degree of Impairment: 53.32%  Standardized Score (AM-PAC Scale): 35.96    PLAN  OT Device Recommendations: 3-in-1 commode  OT Treatment Plan: Balance activities;Energy conservation/work simplification techniques;ADL training;IADL training;Functional transfer training;UE strengthening/ROM;Endurance training;Patient/Family education;Patient/Family training;Equipment eval/education;Compensatory technique education;Continued evaluation  Rehab Potential : Good  Frequency: 3-5x/week    OT Goals:     All goals ongoing     ADL Goals   Patient will perform upper body dressing:  with min assist  Patient will perform toileting: with mod assist     Functional Transfer Goals  Patient will transfer from sit to stand:  with min assist- goal met ; UPGRADE to SBA  Patient will transfer to toilet:  with min assist    OT Session Time: 25 minutes  Therapeutic Activity: 15 minutes  Therapeutic Exercise: 10 minutes